# Patient Record
Sex: MALE | Race: WHITE | Employment: OTHER | URBAN - METROPOLITAN AREA
[De-identification: names, ages, dates, MRNs, and addresses within clinical notes are randomized per-mention and may not be internally consistent; named-entity substitution may affect disease eponyms.]

---

## 2022-12-22 ENCOUNTER — HOSPITAL ENCOUNTER (EMERGENCY)
Age: 72
Discharge: HOME | End: 2022-12-22
Attending: EMERGENCY MEDICINE
Payer: MEDICARE

## 2022-12-22 ENCOUNTER — APPOINTMENT (OUTPATIENT)
Dept: GENERAL RADIOLOGY | Age: 72
End: 2022-12-22
Payer: MEDICARE

## 2022-12-22 VITALS
TEMPERATURE: 98.2 F | RESPIRATION RATE: 16 BRPM | OXYGEN SATURATION: 95 % | WEIGHT: 225 LBS | BODY MASS INDEX: 35.31 KG/M2 | HEART RATE: 65 BPM | HEIGHT: 67 IN

## 2022-12-22 DIAGNOSIS — S46.009A ROTATOR CUFF INJURY, INITIAL ENCOUNTER: Primary | ICD-10-CM

## 2022-12-22 PROCEDURE — 73000 X-RAY EXAM OF COLLAR BONE: CPT

## 2022-12-22 PROCEDURE — 99283 EMERGENCY DEPT VISIT LOW MDM: CPT

## 2022-12-22 PROCEDURE — 73060 X-RAY EXAM OF HUMERUS: CPT

## 2022-12-22 PROCEDURE — 73030 X-RAY EXAM OF SHOULDER: CPT

## 2022-12-22 RX ORDER — HYDROCODONE BITARTRATE AND ACETAMINOPHEN 5; 325 MG/1; MG/1
1 TABLET ORAL EVERY 6 HOURS PRN
Qty: 12 TABLET | Refills: 0 | Status: SHIPPED | OUTPATIENT
Start: 2022-12-22 | End: 2022-12-25

## 2022-12-22 ASSESSMENT — PAIN - FUNCTIONAL ASSESSMENT: PAIN_FUNCTIONAL_ASSESSMENT: 0-10

## 2022-12-22 ASSESSMENT — ENCOUNTER SYMPTOMS
NAUSEA: 0
COUGH: 0
DIARRHEA: 0
WHEEZING: 0
ABDOMINAL PAIN: 0
SHORTNESS OF BREATH: 0
BACK PAIN: 0
VOMITING: 0
SORE THROAT: 0

## 2022-12-22 ASSESSMENT — PAIN SCALES - GENERAL: PAINLEVEL_OUTOF10: 10

## 2022-12-22 NOTE — ED PROVIDER NOTES
[facility] Emergency Department  [id number] [facility] [address] [facility] [address]  Phone: [phone]  Fax: [phone]    eMERGENCY dEPARTMENT eNCOUnter        Pt Name: Forrest Josue  MRN: [id number]  [place] 1950  Date of evaluation: 12/22/22    CHIEF COMPLAINT     Chief Complaint   Patient presents with    Shoulder Injury     Klaus Constable down 4 stairs last night       HISTORY OF PRESENT ILLNESS    Forrest Josue is a 71 y.o. male who presents left shoulder pain. Patient stated last night about 8 PM he was walking outside the door and tripped for cement steps injuring his left shoulder. He has pain across the clavicle down into the humerus. Denies hitting his head or hurting his neck or his back. No headache dizziness. No weakness or numbness anywhere. No chest pain or shortness of breath. No abdominal pain nausea or vomiting. No hip or leg pain. No pain of the right upper extremity. Patient has an isolated injury to the left shoulder. REVIEW OF SYSTEMS     Review of Systems   Constitutional:  Negative for chills and fever. HENT:  Negative for congestion, ear pain and sore throat. Respiratory:  Negative for cough, shortness of breath and wheezing. Cardiovascular:  Negative for chest pain, palpitations and leg swelling. Gastrointestinal:  Negative for abdominal pain, diarrhea, nausea and vomiting. Genitourinary:  Negative for dysuria, flank pain, frequency and hematuria. Musculoskeletal:  Negative for arthralgias, back pain, gait problem, joint swelling, neck pain and neck stiffness. Left shoulder pain increased pain with lifting   Skin:  Negative for rash. Neurological:  Negative for dizziness, light-headedness, numbness and headaches. PAST MEDICAL HISTORY    has no past medical history on file. SURGICAL HISTORY      has no past surgical history on file.     CURRENT MEDICATIONS       Previous Medications    No medications on file       ALLERGIES     has no allergies on file. FAMILY HISTORY     has no family status information on file. family history is not on file. SOCIAL HISTORY      reports that he has never smoked. He has never used smokeless tobacco. He reports that he does not drink alcohol. PHYSICAL EXAM     INITIAL VITALS:  height is 5' 7\" (1.702 m) and weight is 102.1 kg (225 lb). His oral temperature is 98.2 °F (36.8 °C). His pulse is 65. His respiration is 16 and oxygen saturation is 95%. Physical Exam  Vitals and nursing note reviewed. Constitutional:       Appearance: Normal appearance. HENT:      Head: Normocephalic and atraumatic. Nose: Nose normal.      Mouth/Throat:      Mouth: Mucous membranes are moist.   Eyes:      Extraocular Movements: Extraocular movements intact. Pupils: Pupils are equal, round, and reactive to light. Cardiovascular:      Rate and Rhythm: Normal rate and regular rhythm. Pulses: Normal pulses. Heart sounds: Normal heart sounds. Pulmonary:      Effort: Pulmonary effort is normal.      Breath sounds: Normal breath sounds. Abdominal:      General: Abdomen is flat. Bowel sounds are normal.      Palpations: Abdomen is soft. Comments: No pulsatile mass   Musculoskeletal:         General: Normal range of motion. Cervical back: Normal range of motion and neck supple. Comments: Left shoulder tender anteriorly and across the clavicle as well as the mid upper humerus. No bony crepitus or deformity noted. No redness or abrasions. Neurovascularly intact distally. No involvement of the elbow forearm wrist or hand. Good pulses motor and sensory distally. He does have difficulty in raising the arm secondary to pain. Skin:     General: Skin is warm and dry. Neurological:      General: No focal deficit present. Mental Status: He is alert and oriented to person, place, and time. Mental status is at baseline.    Psychiatric:         Mood and Affect: Mood normal. Behavior: Behavior normal.     DIFFERENTIAL DIAGNOSIS / MDM / EMERGENCY DEPARTMENT COURSE:     The patient was personally seen and evaluated by myself. Head to toe examination only revealed tenderness of the anterior shoulder and a little of the clavicle and upper humerus region. It appears that it is most likely rotator cuff injury. X-rays of the clavicle shoulder and humerus were obtained and read interpreted by radiology as unremarkable. With further discussion with the patient it appears that the patient actually was trying to break his caught fall and reached and grabbed probably tearing at this area creating this rotator cuff injury as he fell. I made an appointment with orthopedist tomorrow however if it is not a time or does not work because of the snowstorm he needs to call and cancel and reschedule accordingly. I did give him a prescription for Norco narcotic warnings were given he was given strict instructions to take Colace and/or stool softener along with this. He feels comfortable the disposition treatment plan to home with the above instructions. No indication for any additional testing. DIAGNOSTIC RESULTS     LABS:  No results found for this visit on 12/22/22. All other labs were within normal range or not returned as of this dictation. RADIOLOGY:  XR SHOULDER LEFT (MIN 2 VIEWS)   Preliminary Result   No convincing evidence of acute fracture. AC joint degenerative changes. XR CLAVICLE LEFT   Preliminary Result   No convincing evidence of acute fracture. AC joint degenerative changes. XR HUMERUS LEFT (MIN 2 VIEWS)   Preliminary Result   No convincing evidence of acute fracture. AC joint degenerative changes. XR CLAVICLE LEFT    Result Date: 12/22/2022  EXAMINATION: THREE XRAY VIEWS OF THE LEFT SHOULDER; TWO XRAY VIEWS OF THE LEFT HUMERUS; TWO XRAY VIEWS OF THE LEFT CLAVICLE 12/22/2022 9:52 am COMPARISON: None.  HISTORY: ORDERING SYSTEM PROVIDED HISTORY: fall TECHNOLOGIST PROVIDED HISTORY: fall Reason for Exam: Pt fell. Pain to top of shoulder radiating into left upper arm. Pain with movement FINDINGS: There is no convincing evidence of acute fracture or dislocation. Bone mineralization and alignment appear intact. Mild AC joint degenerative changes with possible faint chondrocalcinosis versus minimal periarticular calcification/ossification. Subtle irregularity of the tuberosity can be seen with rotator cuff arthropathy. MRI could assess the rotator cuff if clinically warranted. No radiopaque soft tissue foreign bodies are seen. No convincing evidence of acute fracture. AC joint degenerative changes. XR HUMERUS LEFT (MIN 2 VIEWS)    Result Date: 12/22/2022  EXAMINATION: THREE XRAY VIEWS OF THE LEFT SHOULDER; TWO XRAY VIEWS OF THE LEFT HUMERUS; TWO XRAY VIEWS OF THE LEFT CLAVICLE 12/22/2022 9:52 am COMPARISON: None. HISTORY: ORDERING SYSTEM PROVIDED HISTORY: fall TECHNOLOGIST PROVIDED HISTORY: fall Reason for Exam: Pt fell. Pain to top of shoulder radiating into left upper arm. Pain with movement FINDINGS: There is no convincing evidence of acute fracture or dislocation. Bone mineralization and alignment appear intact. Mild AC joint degenerative changes with possible faint chondrocalcinosis versus minimal periarticular calcification/ossification. Subtle irregularity of the tuberosity can be seen with rotator cuff arthropathy. MRI could assess the rotator cuff if clinically warranted. No radiopaque soft tissue foreign bodies are seen. No convincing evidence of acute fracture. AC joint degenerative changes. XR SHOULDER LEFT (MIN 2 VIEWS)    Result Date: 12/22/2022  EXAMINATION: THREE XRAY VIEWS OF THE LEFT SHOULDER; TWO XRAY VIEWS OF THE LEFT HUMERUS; TWO XRAY VIEWS OF THE LEFT CLAVICLE 12/22/2022 9:52 am COMPARISON: None.  HISTORY: ORDERING SYSTEM PROVIDED HISTORY: fall TECHNOLOGIST PROVIDED HISTORY: fall Reason for Exam: Pt fell. Pain to top of shoulder radiating into left upper arm. Pain with movement FINDINGS: There is no convincing evidence of acute fracture or dislocation. Bone mineralization and alignment appear intact. Mild AC joint degenerative changes with possible faint chondrocalcinosis versus minimal periarticular calcification/ossification. Subtle irregularity of the tuberosity can be seen with rotator cuff arthropathy. MRI could assess the rotator cuff if clinically warranted. No radiopaque soft tissue foreign bodies are seen. No convincing evidence of acute fracture. AC joint degenerative changes. I have reviewed the disposition diagnosis with the patient and or their family/guardian. I have answered their questions and givendischarge instructions. They voiced understanding of these instructions and did not have any further questions or complaints. PROCEDURES:  Unless otherwise noted below, none     Procedures    FINAL IMPRESSION      1. Rotator cuff injury, initial encounter          DISPOSITION/PLAN   DISPOSITION Decision To Discharge 12/22/2022 11:08:30 AM      PATIENT REFERRED TO:  No follow-up provider specified. DISCHARGE MEDICATIONS:  New Prescriptions    HYDROCODONE-ACETAMINOPHEN (NORCO) 5-325 MG PER TABLET    Take 1 tablet by mouth every 6 hours as needed for Pain for up to 3 days. Intended supply: 3 days.  Take lowest dose possible to manage pain Max Daily Amount: 4 tablets          (Please note that portions of this note were completed with a voice recognition program.  Efforts were made to edit the dictations but occasionally words are mis-transcribed.)    Renny Higginbotham DO,(electronically signed)  Board Certified Emergency Physician       Renny Higginbotham DO  12/22/22 1111

## 2022-12-22 NOTE — DISCHARGE INSTRUCTIONS
Return to the emergency department if symptoms worsen or persist.  Follow-up with orthopedist as scheduled for tomorrow on December 23 at 8:30 AM.  Please call to reschedule this or cancel if its not a time that works for your schedule. Ice the area. Avoid using that left upper extremity. Take the Norco as prescribed.

## 2022-12-23 ENCOUNTER — OFFICE VISIT (OUTPATIENT)
Dept: ORTHOPEDIC SURGERY | Age: 72
End: 2022-12-23

## 2022-12-23 VITALS — RESPIRATION RATE: 14 BRPM | WEIGHT: 225 LBS | HEIGHT: 67 IN | BODY MASS INDEX: 35.31 KG/M2

## 2022-12-23 DIAGNOSIS — S46.012A STRAIN OF LEFT ROTATOR CUFF CAPSULE, INITIAL ENCOUNTER: Primary | ICD-10-CM

## 2022-12-23 RX ORDER — LIDOCAINE HYDROCHLORIDE 10 MG/ML
3 INJECTION, SOLUTION INFILTRATION; PERINEURAL ONCE
Status: COMPLETED | OUTPATIENT
Start: 2022-12-23 | End: 2022-12-23

## 2022-12-23 RX ORDER — DICLOFENAC SODIUM 75 MG/1
75 TABLET, DELAYED RELEASE ORAL 2 TIMES DAILY WITH MEALS
Qty: 28 TABLET | Refills: 0 | Status: SHIPPED | OUTPATIENT
Start: 2022-12-23 | End: 2023-01-06

## 2022-12-23 RX ORDER — BETAMETHASONE SODIUM PHOSPHATE AND BETAMETHASONE ACETATE 3; 3 MG/ML; MG/ML
6 INJECTION, SUSPENSION INTRA-ARTICULAR; INTRALESIONAL; INTRAMUSCULAR; SOFT TISSUE ONCE
Status: COMPLETED | OUTPATIENT
Start: 2022-12-23 | End: 2022-12-23

## 2022-12-23 RX ADMIN — BETAMETHASONE SODIUM PHOSPHATE AND BETAMETHASONE ACETATE 6 MG: 3; 3 INJECTION, SUSPENSION INTRA-ARTICULAR; INTRALESIONAL; INTRAMUSCULAR; SOFT TISSUE at 09:27

## 2022-12-23 RX ADMIN — LIDOCAINE HYDROCHLORIDE 3 ML: 10 INJECTION, SOLUTION INFILTRATION; PERINEURAL at 09:29

## 2022-12-23 NOTE — PROGRESS NOTES
Orthopedic Shoulder Encounter Note     Chief complaint: left shoulder pain    HPI: Candance Anes is a 71 y.o.  right-hand dominant male who presents for evaluation of his left shoulder. Patient reports a trip and fall that occurred on 12/21/2022 does not recall exactly how he fell but believes he tried to brace himself with his left arm he did have significant upper left arm pain was evaluated in the ED yesterday due to continued pain where he had x-rays of the humerus shoulder and clavicle that are negative for any acute fracture did demonstrate some early DJD of the left AC joint. Patient was diagnosed with a rotator cuff strain started on Norco and given follow-up for today for further evaluation. Patient returns today stating that his pain has improved minimally unfortunately. Localized pain most severely to the anterior lateral shoulder. Significant limitation in overhead range of motion secondary to pain. Patient denies any radiation pain down the arm states the pain is relatively localized to the shoulder no numbness or tingling. Patient reports he is in town visiting family normally resides in [place] admits to returning on 1/1/2023. Previous treatment:    NSAIDs: Ibuprofen    Physical Therapy:  None    Injections: None    Surgeries: None    Review of Systems:   Constitutional: Negative for fever, chills, sweats. Pain Score:   8  Neurological: Negative for headache, numbness, or weakness. Musculoskeletal: As noted in HPI     Past Medical History  Amadou Gaston  has no past medical history on file. Past Surgical History  Amadou Gaston  has no past surgical history on file. Current Medications  Current Outpatient Medications   Medication Sig Dispense Refill    diclofenac (VOLTAREN) 75 MG EC tablet Take 1 tablet by mouth 2 times daily (with meals) for 14 days 28 tablet 0    HYDROcodone-acetaminophen (NORCO) 5-325 MG per tablet Take 1 tablet by mouth every 6 hours as needed for Pain for up to 3 days. Intended supply: 3 days. Take lowest dose possible to manage pain Max Daily Amount: 4 tablets 12 tablet 0     No current facility-administered medications for this visit. Allergies  Allergies have been reviewed. Amadou Gaston has No Known Allergies. Social History  Amadou Gaston  reports that he has never smoked. He has never used smokeless tobacco. He reports that he does not drink alcohol. Family History  Ghulam's family history is not on file. Physical Exam:     Resp 14   Ht 5' 7\" (1.702 m)   Wt 225 lb (102.1 kg)   BMI 35.24 kg/m²    Constitutional: Patient is oriented to person, place, and time. Patient appears well-developed and well nourished. Mental Status/psychiatric: Behavior is normal. Thought content normal.  HENT: Negative otherwise noted  Head: Normocephalic and Atraumatic  Nose: Normal  Respiratory/Cardio: Effort normal. No respiratory distress. Neck: Normal range of motion Neck supple. Gait: normal    Shoulder:    Skin: Skin is warm and dry; no swelling or obvious muscular atrophy. Vasculature: 2+ radial pulses bilaterally  Neuro: Sensation grossly intact to light touch diffusely  Tenderness: Focal tenderness over the lateral shoulder in the subacromial area. No tenderness to the anterior shoulder bicipital groove or AC joint.     ROM: (Degrees)    Right   A P   Left   A P    Elevation  170    Elevation  80 150  Abduction  170    Abduction  100  120  ER   70    ER   50 70  IR   L1    IR   L5   90 abd/ER      90 abd/ER     90 abd/IR      90 abd/IR     Crepitation  No    Crepitation No  Dyskenesia  No    Dyskenesia No      Muscle strength:    Right       Left    Deltoid   5    Deltoid   5  Supraspinatus  5    Supraspinatus  4  ER   5    ER   5  IR   5    IR   5    Special tests    Right   Rotator Cuff    Left    n   Painful arc    y   n   Pain with ER    n    n   Neer's     y    n   Hawkin's    y    n   Drop Arm    n  n   Lift off/Belly Press   n  n   ER Lag    y          [facility] Joint  n   [facility] tenderness   n  n   Cross-chest adduction  n       Labrum/biceps    n   Bailey's    y   n   Biceps sheer    n      n   Speed's/Yergason's   n    n   Tenderness Biceps Groove  n    n   Alen's    n         Instability  n   Ant Apprehension   n    n   Post Apprehension   n    n   Ant Load shift    n    n   Post Load shift   n   n   Sulcus     n  n   Generalized Laxity   n  n   Relocation test   n  n   Crank test     n  n   Yusuf-superior escape  n       Imaging:  Xrays: 3 views of the left shoulder obtained on 12/22/2022 were independently reviewed  Indications: Left shoulder pain after a fall on 12/21/2022  Findings: Early degenerative changes of left acromioclavicular joint. Glenohumeral joint space overall maintained no evidence of acute dislocation, subluxation, fracture or other acute osseous abnormality. Impression/Plan:     Author Tana is a 71 y.o. old male with left shoulder pain that is consistent with rotator cuff strain after fall on 12/21/22. We discussed treatment options available to him, including nonoperative and operative intervention. At this time I believe he will benefit from conservative management. Consequently, a prescription for therapy was provided. We also discussed use of a prescription NSAID and a cortisone injection and at this time he has elected to proceed with     Patient elected to proceed with both. Diclofenac 75 mg twice daily electronically sent to patient pharmacy and a subacromial injection is provided as outlined below. Patient does reside in [place] just in town for the holidays and will be returning next week. He is provided a external referral to physical therapy that he may initiate upon his return home. Given that patient [place] out of state we will have him follow-up on.   This    Procedure: left shoulder subacromial space injection  Following an appropriate discussion with the patient regarding the risks and benefits of the procedure he consented to proceed. his left shoulder was prepped using chlorhexadine solution. Using aseptic technique and through a posterior approach, his left shoulder subacromial space was injected with a 4 cc mixture of 1cc 6mg/mL Celestone and 3 cc of 1% lidocaine without epinephrine. A band aid was applied to the injection site. he tolerated the injection with no immediate adverse reactions. This note is created with the assistance of a speech recognition program.  While intending to generate adocument that actually reflects the content of the visit, the document can still have some errors including those of syntax and sound a like substitutions which may escape proof reading. It such instances, actual meaningcan be extrapolated by contextual diversion.     NA = Not assessed  RTC = Rotator cuff  RCT = Rotator cuff tear  ER = External rotation  IR = Internal rotation  AC = Acromioclavicular  GH = Glenohumeral  n = No  y = Yes